# Patient Record
Sex: FEMALE | Race: WHITE | NOT HISPANIC OR LATINO | URBAN - METROPOLITAN AREA
[De-identification: names, ages, dates, MRNs, and addresses within clinical notes are randomized per-mention and may not be internally consistent; named-entity substitution may affect disease eponyms.]

---

## 2020-11-11 ENCOUNTER — HOSPITAL ENCOUNTER (EMERGENCY)
Facility: HOSPITAL | Age: 50
Discharge: LEFT BEFORE DISCHARGE | End: 2020-11-11
Attending: EMERGENCY MEDICINE | Admitting: EMERGENCY MEDICINE

## 2020-11-11 VITALS
RESPIRATION RATE: 18 BRPM | WEIGHT: 140 LBS | HEIGHT: 64 IN | BODY MASS INDEX: 23.9 KG/M2 | DIASTOLIC BLOOD PRESSURE: 87 MMHG | TEMPERATURE: 97.2 F | OXYGEN SATURATION: 98 % | HEART RATE: 101 BPM | SYSTOLIC BLOOD PRESSURE: 136 MMHG

## 2020-11-11 DIAGNOSIS — F19.10 SUBSTANCE ABUSE (HCC): ICD-10-CM

## 2020-11-11 DIAGNOSIS — R41.82 ALTERED MENTAL STATUS, UNSPECIFIED ALTERED MENTAL STATUS TYPE: Primary | ICD-10-CM

## 2020-11-11 LAB
ALBUMIN SERPL-MCNC: 3.8 G/DL (ref 3.5–5.2)
ALBUMIN/GLOB SERPL: 1.2 G/DL
ALP SERPL-CCNC: 129 U/L (ref 39–117)
ALT SERPL W P-5'-P-CCNC: 16 U/L (ref 1–33)
ANION GAP SERPL CALCULATED.3IONS-SCNC: 8 MMOL/L (ref 5–15)
AST SERPL-CCNC: 25 U/L (ref 1–32)
BILIRUB SERPL-MCNC: 0.2 MG/DL (ref 0–1.2)
BUN SERPL-MCNC: 12 MG/DL (ref 6–20)
BUN/CREAT SERPL: 16.2 (ref 7–25)
CALCIUM SPEC-SCNC: 8.7 MG/DL (ref 8.6–10.5)
CHLORIDE SERPL-SCNC: 105 MMOL/L (ref 98–107)
CO2 SERPL-SCNC: 28 MMOL/L (ref 22–29)
CREAT SERPL-MCNC: 0.74 MG/DL (ref 0.57–1)
ETHANOL UR QL: <0.01 %
GFR SERPL CREATININE-BSD FRML MDRD: 82 ML/MIN/1.73
GLOBULIN UR ELPH-MCNC: 3.1 GM/DL
GLUCOSE BLDC GLUCOMTR-MCNC: 74 MG/DL (ref 70–105)
GLUCOSE SERPL-MCNC: 84 MG/DL (ref 65–99)
POTASSIUM SERPL-SCNC: 3.8 MMOL/L (ref 3.5–5.2)
PROT SERPL-MCNC: 6.9 G/DL (ref 6–8.5)
SODIUM SERPL-SCNC: 141 MMOL/L (ref 136–145)
WHOLE BLOOD HOLD SPECIMEN: NORMAL

## 2020-11-11 PROCEDURE — 96374 THER/PROPH/DIAG INJ IV PUSH: CPT

## 2020-11-11 PROCEDURE — 82962 GLUCOSE BLOOD TEST: CPT

## 2020-11-11 PROCEDURE — 80053 COMPREHEN METABOLIC PANEL: CPT | Performed by: EMERGENCY MEDICINE

## 2020-11-11 PROCEDURE — 80307 DRUG TEST PRSMV CHEM ANLYZR: CPT | Performed by: EMERGENCY MEDICINE

## 2020-11-11 PROCEDURE — 99284 EMERGENCY DEPT VISIT MOD MDM: CPT

## 2020-11-11 RX ORDER — NALOXONE HYDROCHLORIDE 1 MG/ML
2 INJECTION INTRAMUSCULAR; INTRAVENOUS; SUBCUTANEOUS ONCE
Status: COMPLETED | OUTPATIENT
Start: 2020-11-11 | End: 2020-11-11

## 2020-11-11 RX ADMIN — SODIUM CHLORIDE 1000 ML: 9 INJECTION, SOLUTION INTRAVENOUS at 02:23

## 2020-11-11 RX ADMIN — NALOXONE HYDROCHLORIDE 2 MG: 1 INJECTION PARENTERAL at 01:57

## 2020-11-11 NOTE — ED PROVIDER NOTES
Subjective   Patient transferred for decreased mental status at the [facility], no other details are available, patient is drowsy but oriented without complaints otherwise.  Patient had some mild abdominal pain and vomiting earlier and admits to alcohol intake.          Review of Systems   Gastrointestinal: Positive for abdominal pain, nausea and vomiting.   Psychiatric/Behavioral:        Altered mental status, alcohol intake   All other systems reviewed and are negative.      No past medical history on file.    Allergies   Allergen Reactions   • Asa [Aspirin] Unknown - Low Severity   • Vicodin [Hydrocodone-Acetaminophen] Unknown - Low Severity       No past surgical history on file.    No family history on file.    Social History     Socioeconomic History   • Marital status: Unknown     Spouse name: Not on file   • Number of children: Not on file   • Years of education: Not on file   • Highest education level: Not on file           Objective   Physical Exam  Constitutional:       Comments: Very drowsy female, airway intact, speech clear   HENT:      Head: Normocephalic and atraumatic.      Mouth/Throat:      Comments: Dry  Eyes:      Comments: Pupils pinpoint bilaterally   Neck:      Musculoskeletal: Normal range of motion and neck supple.   Cardiovascular:      Rate and Rhythm: Normal rate and regular rhythm.      Heart sounds: Normal heart sounds.   Pulmonary:      Effort: Pulmonary effort is normal.      Breath sounds: Normal breath sounds.   Abdominal:      General: Bowel sounds are normal. There is no distension.      Palpations: Abdomen is soft.      Tenderness: There is no abdominal tenderness.   Musculoskeletal: Normal range of motion.   Skin:     General: Skin is warm and dry.      Capillary Refill: Capillary refill takes less than 2 seconds.   Neurological:      Mental Status: She is oriented to person, place, and time.      Cranial Nerves: No cranial nerve deficit.      Sensory: No sensory deficit.       Motor: No weakness.      Comments: Drowsy   Psychiatric:      Comments: Flat affect, drowsy, otherwise appropriate         Procedures           ED Course                                           MDM  Number of Diagnoses or Management Options  Altered mental status, unspecified altered mental status type:   Substance abuse (CMS/HCC):   Diagnosis management comments: Results for orders placed or performed during the hospital encounter of 11/11/20  -Comprehensive Metabolic Panel  Specimen: Blood       Result                      Value             Ref Range           Glucose                     84                65 - 99 mg/dL       BUN                         12                6 - 20 mg/dL        Creatinine                  0.74              0.57 - 1.00 *       Sodium                      141               136 - 145 mm*       Potassium                   3.8               3.5 - 5.2 mm*       Chloride                    105               98 - 107 mmo*       CO2                         28.0              22.0 - 29.0 *       Calcium                     8.7               8.6 - 10.5 m*       Total Protein               6.9               6.0 - 8.5 g/*       Albumin                     3.80              3.50 - 5.20 *       ALT (SGPT)                  16                1 - 33 U/L          AST (SGOT)                  25                1 - 32 U/L          Alkaline Phosphatase        129 (H)           39 - 117 U/L        Total Bilirubin             0.2               0.0 - 1.2 mg*       eGFR Non African Amer       82                >60 mL/min/1*       Globulin                    3.1               gm/dL               A/G Ratio                   1.2               g/dL                BUN/Creatinine Ratio        16.2              7.0 - 25.0          Anion Gap                   8.0               5.0 - 15.0 m*  -Ethanol  Specimen: Blood       Result                      Value             Ref Range           Ethanol %                    <0.010            %              -POC Glucose Once  Specimen: Blood       Result                      Value             Ref Range           Glucose                     74                70 - 105 mg/*  -Lavender Top       Result                      Value             Ref Range           Extra Tube                                                    hold for add-on    Patient sobered up later in the encounter and demanded to leave.  Patient was yelling profanity at staff, ambulating with a steady gait with clear speech.  I suspect the patient took some sort of sedative as her alcohol level was not elevated but did exhibit signs of intoxication.  Patient did verbalize to the staff that she has issues with substance abuse in the past but was reluctant to provide urine.       Amount and/or Complexity of Data Reviewed  Clinical lab tests: reviewed        Final diagnoses:   Altered mental status, unspecified altered mental status type   Substance abuse (CMS/[facility])            Jame Linares MD  11/11/20 0901

## 2020-11-11 NOTE — ED NOTES
Pt was assisted to [facility] and encouraged to provide urine sample. After several minutes of waiting, pt was unable to provide sample. Pt refused in/out urinary catheter collect/     Benita Quigley RN  11/11/20 0253

## 2020-11-11 NOTE — ED NOTES
Upon Nurse recheck pt states she wants to leave. Pt educated that MD has not put her up for discharge at this time. Pt states she would still like to leave. AMA paper given and signed. Education provided.     Benita Quigley RN  11/11/20 0643